# Patient Record
Sex: FEMALE | Race: WHITE | NOT HISPANIC OR LATINO | Employment: FULL TIME | URBAN - NONMETROPOLITAN AREA
[De-identification: names, ages, dates, MRNs, and addresses within clinical notes are randomized per-mention and may not be internally consistent; named-entity substitution may affect disease eponyms.]

---

## 2017-02-09 ENCOUNTER — OFFICE VISIT (OUTPATIENT)
Dept: MEDICAL GROUP | Facility: CLINIC | Age: 52
End: 2017-02-09
Payer: COMMERCIAL

## 2017-02-09 VITALS
HEART RATE: 75 BPM | OXYGEN SATURATION: 95 % | RESPIRATION RATE: 16 BRPM | DIASTOLIC BLOOD PRESSURE: 80 MMHG | HEIGHT: 61 IN | SYSTOLIC BLOOD PRESSURE: 122 MMHG | TEMPERATURE: 97.5 F | BODY MASS INDEX: 34.36 KG/M2 | WEIGHT: 182 LBS

## 2017-02-09 DIAGNOSIS — Z76.89 ESTABLISHING CARE WITH NEW DOCTOR, ENCOUNTER FOR: ICD-10-CM

## 2017-02-09 DIAGNOSIS — M25.551 BILATERAL HIP PAIN: ICD-10-CM

## 2017-02-09 DIAGNOSIS — F41.9 ANXIETY: ICD-10-CM

## 2017-02-09 DIAGNOSIS — M25.552 BILATERAL HIP PAIN: ICD-10-CM

## 2017-02-09 DIAGNOSIS — E66.9 OBESITY (BMI 30-39.9): ICD-10-CM

## 2017-02-09 DIAGNOSIS — F33.0 MILD EPISODE OF RECURRENT MAJOR DEPRESSIVE DISORDER (HCC): ICD-10-CM

## 2017-02-09 DIAGNOSIS — Z23 NEED FOR TDAP VACCINATION: ICD-10-CM

## 2017-02-09 DIAGNOSIS — Z12.11 SCREENING FOR COLON CANCER: ICD-10-CM

## 2017-02-09 DIAGNOSIS — I10 ESSENTIAL HYPERTENSION: ICD-10-CM

## 2017-02-09 DIAGNOSIS — Z12.39 SCREENING FOR BREAST CANCER: ICD-10-CM

## 2017-02-09 PROCEDURE — 90715 TDAP VACCINE 7 YRS/> IM: CPT | Performed by: NURSE PRACTITIONER

## 2017-02-09 PROCEDURE — 99204 OFFICE O/P NEW MOD 45 MIN: CPT | Mod: 25 | Performed by: NURSE PRACTITIONER

## 2017-02-09 PROCEDURE — 90471 IMMUNIZATION ADMIN: CPT | Performed by: NURSE PRACTITIONER

## 2017-02-09 RX ORDER — ATENOLOL 25 MG/1
25 TABLET ORAL DAILY
COMMUNITY

## 2017-02-09 ASSESSMENT — PATIENT HEALTH QUESTIONNAIRE - PHQ9: CLINICAL INTERPRETATION OF PHQ2 SCORE: 0

## 2017-02-09 ASSESSMENT — PAIN SCALES - GENERAL: PAINLEVEL: 3=SLIGHT PAIN

## 2017-02-09 NOTE — MR AVS SNAPSHOT
"        Sera Chavira   2017 8:00 AM   Office Visit   MRN: [id number]    Department:  [facility] Phone:  [phone]    Description:  Female : 1965   Provider:  AYE Taylor           Reason for Visit     Hip Pain x 2 years getting worse waking her up at night    Other due for Mammo in last Oct, is open for pap an fit test      Allergies as of 2017     No Known Allergies      You were diagnosed with     Establishing care with new doctor, encounter for   [982804]       Obesity (BMI 30-39.9)   [256798]       Essential hypertension   [1147870]       Mild episode of recurrent major depressive disorder (CMS-HCC)   [6838945]       Bilateral hip pain   [372686]       Anxiety   [693008]       Screening for breast cancer   [583143]       Screening for colon cancer   [867195]       Need for Tdap vaccination   [189551]         Vital Signs     Blood Pressure Pulse Temperature Respirations Height Weight    122/80 mmHg 75 36.4 °C (97.5 °F) 16 1.549 m (5' 0.98\") 82.555 kg (182 lb)    Body Mass Index Oxygen Saturation Smoking Status             34.41 kg/m2 95% Never Smoker          Basic Information     Date Of Birth Sex Race Ethnicity Preferred Language    1965 Female White Non- English      Problem List              ICD-10-CM Priority Class Noted - Resolved    Obesity (BMI 30-39.9) E66.9   2017 - Present    Establishing care with new doctor, encounter for Z71.89   2017 - Present    Essential hypertension I10   2017 - Present    Mild episode of recurrent major depressive disorder (CMS-HCC) F33.0   2017 - Present    Bilateral hip pain M25.551, M25.552   2017 - Present    Anxiety F41.9   2017 - Present      Health Maintenance        Date Due Completion Dates    IMM DTaP/Tdap/Td Vaccine (1 - Tdap) 1982 ---    PAP SMEAR 1984 ---    MAMMOGRAM 2003 ---    COLONOSCOPY 2013 ---    IMM INFLUENZA (1) 2016 ---            Current " Immunizations     Tdap Vaccine 2/9/2017      Below and/or attached are the medications your provider expects you to take. Review all of your home medications and newly ordered medications with your provider and/or pharmacist. Follow medication instructions as directed by your provider and/or pharmacist. Please keep your medication list with you and share with your provider. Update the information when medications are discontinued, doses are changed, or new medications (including over-the-counter products) are added; and carry medication information at all times in the event of emergency situations     Allergies:  No Known Allergies          Medications  Valid as of: February 09, 2017 -  8:42 AM    Generic Name Brand Name Tablet Size Instructions for use    Atenolol (Tab) TENORMIN 25 MG Take 25 mg by mouth every day.        .                 Medicines prescribed today were sent to:     [facility] [address] [place] [address] - [address] [facility]    [address] [facility] [address]    Phone: [phone] Fax: [phone]    Open 24 Hours?: No      Medication refill instructions:       If your prescription bottle indicates you have medication refills left, it is not necessary to call your provider’s office. Please contact your pharmacy and they will refill your medication.    If your prescription bottle indicates you do not have any refills left, you may request refills at any time through one of the following ways: The online [employer] system (except Urgent Care), by calling your provider’s office, or by asking your pharmacy to contact your provider’s office with a refill request. Medication refills are processed only during regular business hours and may not be available until the next business day. Your provider may request additional information or to have a follow-up visit with you prior to refilling your medication.   *Please Note: Medication refills are assigned a new Rx number when refilled  electronically. Your pharmacy may indicate that no refills were authorized even though a new prescription for the same medication is available at the pharmacy. Please request the medicine by name with the pharmacy before contacting your provider for a refill.        Your To Do List     Future Labs/Procedures Complete By Expires    DX-HIP-COMPLETE - UNILATERAL 2+ LEFT  As directed 2/9/2018    DX-HIP-COMPLETE - UNILATERAL 2+ RIGHT  As directed 2/9/2018      Referral     A referral request has been sent to our patient care coordination department. Please allow 3-5 business days for us to process this request and contact you either by phone or mail. If you do not hear from us by the 5th business day, please call us at [phone].        Instructions    Hip Pain  Your hip is the joint between your upper legs and your lower pelvis. The bones, cartilage, tendons, and muscles of your hip joint perform a lot of work each day supporting your body weight and allowing you to move around.  Hip pain can range from a minor ache to severe pain in one or both of your hips. Pain may be felt on the inside of the hip joint near the groin, or the outside near the buttocks and upper thigh. You may have swelling or stiffness as well.   HOME CARE INSTRUCTIONS   · Take medicines only as directed by your health care provider.  · Apply ice to the injured area:  ¨ Put ice in a plastic bag.  ¨ Place a towel between your skin and the bag.  ¨ Leave the ice on for 15-20 minutes at a time, 3-4 times a day.  · Keep your leg raised (elevated) when possible to lessen swelling.  · Avoid activities that cause pain.  · Follow specific exercises as directed by your health care provider.  · Sleep with a pillow between your legs on your most comfortable side.  · Record how often you have hip pain, the location of the pain, and what it feels like.  SEEK MEDICAL CARE IF:   · You are unable to put weight on your leg.  · Your hip is red or swollen or very  tender to touch.  · Your pain or swelling continues or worsens after 1 week.  · You have increasing difficulty walking.  · You have a fever.  SEEK IMMEDIATE MEDICAL CARE IF:   · You have fallen.  · You have a sudden increase in pain and swelling in your hip.  MAKE SURE YOU:   · Understand these instructions.  · Will watch your condition.  · Will get help right away if you are not doing well or get worse.     This information is not intended to replace advice given to you by your health care provider. Make sure you discuss any questions you have with your health care provider.     Document Released: 06/07/2011 Document Revised: 01/08/2016 Document Reviewed: 08/14/2014  [employer] Interactive Patient Education ©2016 [employer]    Your medical care was provided today by: GLORIA Henao    Thank You for the opportunity to serve you.    You may receive a brief survey in the mail shortly regarding your visit today. Please take a few moments to complete the survey and return it; no postage is necessary. We are working to serve our patient population better, improve customer service and our patients overall experience and your input can help us to accomplish this. We thank you for your help and for the opportunity to serve you today and in the future.     Special Instructions:  Always call 9-1-1 immediately if you develop a life threatening emergency.    Unless told otherwise please take all medications as directed and complete prescription therapies.     Watch for the following signs that require additional evaluation: progressive lethargy or unresponsiveness, localized pain (chest, abdomen), shortness of breath, painful breathing, progressive vomiting with weakness, bloody stools, or new rash.     If you are prescribed pain medication or any other medication that is sedating, do not take medication before or while operating a vehicle or heavy machinery or equipment due to potential side effects such as drowsiness  and/or dizziness.            Cartavi Access Code: [email or web address]  Expires: 3/11/2017  8:21 AM    Your email address is not on file at [facility].  Email Addresses are required for you to sign up for Cartavi, please contact [phone] to verify your personal information and to provide your email address prior to attempting to register for Cartavi.    Sera Chavira  [address] [place] [address], [place] [address]    Cartavi  A secure, online tool to manage your health information     [facility]’s Cartavi® is a secure, online tool that connects you to your personalized health information from the privacy of your home -- day or night - making it very easy for you to manage your healthcare. Once the activation process is completed, you can even access your medical information using the Cartavi lizette, which is available for free in the Apple Lizette store or Google Play store.     To learn more about Cartavi, visit [email or web address]    There are two levels of access available (as shown below):   My Chart Features  [facility] Primary Care Doctor [facility]  Specialists [facility]  Urgent  Care Non-[facility] Primary Care Doctor   Email your healthcare team securely and privately 24/7 X X X    Manage appointments: schedule your next appointment; view details of past/upcoming appointments X      Request prescription refills. X      View recent personal medical records, including lab and immunizations X X X X   View health record, including health history, allergies, medications X X X X   Read reports about your outpatient visits, procedures, consult and ER notes X X X X   See your discharge summary, which is a recap of your hospital and/or ER visit that includes your diagnosis, lab results, and care plan X X  X     How to register for Cartavi:  Once your e-mail address has been verified, follow the following steps to sign up for Cartavi.     1. Go to  [email or web address]  2. Click on the Sign Up Now box, which takes you to the New  Member Sign Up page. You will need to provide the following information:  a. Enter your [employer] Access Code exactly as it appears at the top of this page. (You will not need to use this code after you’ve completed the sign-up process. If you do not sign up before the expiration date, you must request a new code.)   b. Enter your date of birth.   c. Enter your home email address.   d. Click Submit, and follow the next screen’s instructions.  3. Create a [employer] ID. This will be your [employer] login ID and cannot be changed, so think of one that is secure and easy to remember.  4. Create a [employer] password. You can change your password at any time.  5. Enter your Password Reset Question and Answer. This can be used at a later time if you forget your password.   6. Enter your e-mail address. This allows you to receive e-mail notifications when new information is available in [employer].  7. Click Sign Up. You can now view your health information.    For assistance activating your [employer] account, call [phone]

## 2017-02-09 NOTE — PROGRESS NOTES
Chief Complaint   Patient presents with   • Hip Pain     x 2 years getting worse waking her up at night   • Other     due for Mammo in last Oct, is open for pap an fit test        Sera Chavira is a 52 y.o. female here today to establish care and to discuss the evaluation and management of:    HPI:      Establishing care with new doctor, encounter for  Patient has lived here in [place] for about 3 years. She has been seeing Dr. Lujan, a conciege provider, from [place]. She now has insurance and would like to est. Care. She is a , however, recently has a new boyfriend.     Previously she worked at the [employer], was walking several hours a day. She currently is an  in [place].         Mild episode of recurrent major depressive disorder (CMS-[facility])  Patient reports a history of taking Lexapro daily, she stopped this medication approximately 3 months ago on her own. Reports the medication was causing her to have decreased sex drive. She has been stable since stopping her medication. Denies SI/HI. She does not believe she needs any medication at this time.         Obesity (BMI 30-39.9)  Patient reports she is active at her current job, often walking several miles a day. She has tried to eat a healthy diet, she is aware that her diet could improve.    Bilateral hip pain  Reports hip pain for about 2 years. She has been treated previously with bilateral hip injections, which did work at one time, however, her most recent injection about 6 months ago, did not seem to help as much. She has a hard time sleeping on each of her side. Pain is worse at the end of the work day. She has not completed at PT, this is tough due to her work schedule. Denies any fall or traumatic injury. She does report she has rode horses for much of her life Which she thinks may contribute to her hip pain. Denies any redness or signs of infection in her joints. She has never had any imaging done.    Essential  hypertension  Patient has been taking atenolol 25 mg daily for several years. She has been stable, denies any dizziness, edema, chest pain.    Anxiety  Intermittent anxiety, sometimes she feels like her heart is racing. This does not happen often, she feels well controlled at this time.       Current medicines (including changes today)  Current Outpatient Prescriptions   Medication Sig Dispense Refill   • atenolol (TENORMIN) 25 MG Tab Take 25 mg by mouth every day.       No current facility-administered medications for this visit.       She  has a past medical history of Depression and Hypertension.    She  has past surgical history that includes shoulder arthroscopy w/ slap / labral repair and abdominal hysterectomy total.    Social History   Substance Use Topics   • Smoking status: Never Smoker    • Smokeless tobacco: Never Used   • Alcohol Use: No       Social History     Social History Narrative   • No narrative on file       Family History   Problem Relation Age of Onset   • Heart Disease Mother    • Hypertension Mother    • Diabetes Mother    • Hyperlipidemia Mother    • Alcohol/Drug Father    • Cancer Paternal Grandmother        Family Status   Relation Status Death Age   • Mother     • Father     • Paternal Grandmother         ROS   Constitutional: Denies fever, chills, or sweats  Eyes: negative for visual blurring, double vision, eye pain, floaters and discharge from eyes  ENT: negative for tinnitus, vertigo, bleeding gums, dental problem and hoarseness, frequent URI's, sinus trouble, persistent sore throat  Respiratory: negative for persistent cough, hemoptysis, dyspnea, recurrent pneumonia or bronchitis, asthma and wheezing  Cardiovascular: negative for palpitations, tachycardia, irregular heart beat, chest pain, or peripheral edema.  Gastrointestinal: negative for poor appetite, dysphagia, nausea, heartburn or reflux, abdominal pain, hemorrhoids, constipation or  "diarrhea  Genitourinary: negative for vaginal bleeding, discharge. Negative for dysuria.   Musculoskeletal: negative for joint swelling. Positive for bilateral hip pain.   Skin: negative for rash, scaling, itching, pigmentation, hair or nail changes.  Neurologic: negative for migraine headaches, involuntary movements or tremor  Psychiatric: negative for excessive alcohol consumption or illegal drug usage, sleep disturbance. Positive for depression, anxiety, well controlled.   Hematologic/Lymphatic/Immunologic: negative for anemia, unusual bruising, swollen glands  Endocrine: negative for temperature intolerance, polydipsia, polyuria, unintentional weight changes.        Objective:     Blood pressure 122/80, pulse 75, temperature 36.4 °C (97.5 °F), resp. rate 16, height 1.549 m (5' 0.98\"), weight 82.555 kg (182 lb), SpO2 95 %. Body mass index is 34.41 kg/(m^2).    Physical Exam:   Constitutional: Alert, no distress.  Eye: Equal, round and reactive, conjunctiva clear, lids normal.  ENMT: Lips without lesions, good dentition, oropharynx clear.  Neck: Trachea midline, no masses, no thyromegaly.   Respiratory: Unlabored respiratory effort, lungs clear to auscultation, no wheezes, no ronchi.  Cardiovascular: Normal S1, S2, no murmur, no edema.   Abdomen: Soft, non-tender, no masses, no hepatosplenomegaly. Normal bowel sounds.   Psych: Alert and oriented x3, normal affect and mood.    HIP Exam:      Gait: Normal, Patient is not using any assistive device today.      ROM: Abduction:  45 deg                Adduction:  25 deg                Internal Rotation:  45 deg                External Rotation:  45 deg     Straight leg raise:  Normal bilaterally with mild reported pain  Neuro: Alert, Oriented X 3, cooperative, moving all extremities. Full/symmetrical motor strength. Normal/symmetrical DTR's. Normal proximal/distal sensory in extremities.  SKIN: No rashes, redness, heat or signs of infection noted at joint. "     RADIOGRAPHS:  Ordered bilateral Hip xray, pending results         Assessment and Plan:   The following treatment plan was discussed    1. Establishing care with new doctor, encounter for  Advised patient to complete release of records. I do not have any prior records to review today.    2. Obesity (BMI 30-39.9)  Advised to continue with healthy diet. Advised to exercise 3-5 days weekly.  - Patient identified as having weight management issue.  Appropriate orders and counseling given.    3. Essential hypertension  Patient is not in need of refills today. Advised may continue with atenolol 25 mg daily. Discussed signs and symptoms to seek emergent care. Likely this medication also helps to control her anxiety.    4. Mild episode of recurrent major depressive disorder (CMS-HCC)  Patient is stable without current medication. Discussed signs and symptoms to seek emergent care. Patient denies SI/HI. She is not interested in seeing a counselor at this time, her job without allow for take time off. She appears in good spirits today, with her new boyfriend she reports she has good support. Her and her partner thinking of getting .    5. Bilateral hip pain  Advised patient to obtain x-rays. Pending x-ray results advised to start physical therapy. Patient reports she may not be able to complete physical therapy due to her strict work schedule.   - DX-HIP-COMPLETE - UNILATERAL 2+ RIGHT; Future  - DX-HIP-COMPLETE - UNILATERAL 2+ LEFT; Future  - REFERRAL TO PHYSICAL THERAPY Reason for Therapy: Eval/Treat/Report    6. Anxiety  Patient is currently stable. We'll continue to monitor.    7. Screening for breast cancer  - MA-SCREEN MAMMO W/CAD-BILAT    8. Screening for colon cancer  - REFERRAL TO GI FOR COLONOSCOPY    9. Need for Tdap vaccination  - Tdap =>8yo IM       Reviewed risks and benefits of treatment plan. Patient verbally agrees to plan of care.     Records requested.    Followup: Return in about 3 months (around  5/9/2017) for Annual Exam.    OBI Taylor.     PLEASE NOTE: This dictation was created using voice recognition software. I have made every reasonable attempt to correct obvious errors, but I expect that there may be errors of grammar and possibly content that I did not discover prior finalizing this note.

## 2017-02-09 NOTE — ASSESSMENT & PLAN NOTE
Patient reports she is active at her current job, often walking several miles a day. She has tried to eat a healthy diet, she is aware that her diet could improve.

## 2017-02-09 NOTE — PATIENT INSTRUCTIONS
Hip Pain  Your hip is the joint between your upper legs and your lower pelvis. The bones, cartilage, tendons, and muscles of your hip joint perform a lot of work each day supporting your body weight and allowing you to move around.  Hip pain can range from a minor ache to severe pain in one or both of your hips. Pain may be felt on the inside of the hip joint near the groin, or the outside near the buttocks and upper thigh. You may have swelling or stiffness as well.   HOME CARE INSTRUCTIONS   · Take medicines only as directed by your health care provider.  · Apply ice to the injured area:  ¨ Put ice in a plastic bag.  ¨ Place a towel between your skin and the bag.  ¨ Leave the ice on for 15-20 minutes at a time, 3-4 times a day.  · Keep your leg raised (elevated) when possible to lessen swelling.  · Avoid activities that cause pain.  · Follow specific exercises as directed by your health care provider.  · Sleep with a pillow between your legs on your most comfortable side.  · Record how often you have hip pain, the location of the pain, and what it feels like.  SEEK MEDICAL CARE IF:   · You are unable to put weight on your leg.  · Your hip is red or swollen or very tender to touch.  · Your pain or swelling continues or worsens after 1 week.  · You have increasing difficulty walking.  · You have a fever.  SEEK IMMEDIATE MEDICAL CARE IF:   · You have fallen.  · You have a sudden increase in pain and swelling in your hip.  MAKE SURE YOU:   · Understand these instructions.  · Will watch your condition.  · Will get help right away if you are not doing well or get worse.     This information is not intended to replace advice given to you by your health care provider. Make sure you discuss any questions you have with your health care provider.     Document Released: 06/07/2011 Document Revised: 01/08/2016 Document Reviewed: 08/14/2014  [email or web address] Interactive Patient Education ©2016 [employer]    Your medical care was  provided today by: GLORIA Henao    Thank You for the opportunity to serve you.    You may receive a brief survey in the mail shortly regarding your visit today. Please take a few moments to complete the survey and return it; no postage is necessary. We are working to serve our patient population better, improve customer service and our patients overall experience and your input can help us to accomplish this. We thank you for your help and for the opportunity to serve you today and in the future.     Special Instructions:  Always call 9-1-1 immediately if you develop a life threatening emergency.    Unless told otherwise please take all medications as directed and complete prescription therapies.     Watch for the following signs that require additional evaluation: progressive lethargy or unresponsiveness, localized pain (chest, abdomen), shortness of breath, painful breathing, progressive vomiting with weakness, bloody stools, or new rash.     If you are prescribed pain medication or any other medication that is sedating, do not take medication before or while operating a vehicle or heavy machinery or equipment due to potential side effects such as drowsiness and/or dizziness.

## 2017-02-09 NOTE — ASSESSMENT & PLAN NOTE
Intermittent anxiety, sometimes she feels like her heart is racing. This does not happen often, she feels well controlled at this time.

## 2017-02-09 NOTE — ASSESSMENT & PLAN NOTE
Patient has lived here in [place] for about 3 years. She has been seeing Dr. Lujan, a olive provider, from [place]. She now has insurance and would like to est. Care. She is a , however, recently has a new boyfriend.     Previously she worked at the [employer], was walking several hours a day. She currently is an  in [place].

## 2017-02-09 NOTE — ASSESSMENT & PLAN NOTE
Reports hip pain for about 2 years. She has been treated previously with bilateral hip injections, which did work at one time, however, her most recent injection about 6 months ago, did not seem to help as much. She has a hard time sleeping on each of her side. Pain is worse at the end of the work day. She has not completed at PT, this is tough due to her work schedule. Denies any fall or traumatic injury. She does report she has rode horses for much of her life Which she thinks may contribute to her hip pain. Denies any redness or signs of infection in her joints. She has never had any imaging done.

## 2017-02-09 NOTE — ASSESSMENT & PLAN NOTE
Patient reports a history of taking Lexapro daily, she stopped this medication approximately 3 months ago on her own. Reports the medication was causing her to have decreased sex drive. She has been stable since stopping her medication. Denies SI/HI. She does not believe she needs any medication at this time.

## 2017-02-09 NOTE — Clinical Note
2/9/2017    Subject: Action Required to Complete PricePanda Activation    Dear Sera Chavira,    Thank you for enrolling in PricePanda, a free online tool where you can schedule appointments, request prescription refills, view test results and more. To complete your PricePanda activation, please follow the instructions below.     1. Visit [employer]     2. Click “Sign Up Now”    3. Enter activation code: [email or web address]  Expires: 3/11/2017  8:21 AM    4. Follow the instructions on screen to complete the quick, 3-step activation    Once you’ve completed your activation, you’re ready to view your medical record. Please remember, PricePanda is not to be used for urgent needs. For medical emergencies, dial 911.    Benefits of [employer]’s secure online tool allows you to manage your health information day or night. PricePanda allows you to:    • Schedule and view appointments  • View test results  • Request prescription refills  • Message your healthcare provider  • Keep track of your family’s health  • Review immunization records  • View or download your Summary of Care document    Download the PricePanda Lizette   After you’ve created a login by following the steps above, you can download the PricePanda lizette for your smartphone for even quicker access. Simply visit the lizette store and search “PricePanda.”    For questions or assistance with PricePanda, please call Customer Service at [phone].    Sincerely,  Customer Service Team

## 2017-02-13 ENCOUNTER — APPOINTMENT (OUTPATIENT)
Dept: RADIOLOGY | Facility: IMAGING CENTER | Age: 52
End: 2017-02-13
Attending: NURSE PRACTITIONER
Payer: COMMERCIAL

## 2017-02-13 ENCOUNTER — NON-PROVIDER VISIT (OUTPATIENT)
Dept: URGENT CARE | Facility: PHYSICIAN GROUP | Age: 52
End: 2017-02-13
Payer: COMMERCIAL

## 2017-02-13 DIAGNOSIS — M25.551 BILATERAL HIP PAIN: ICD-10-CM

## 2017-02-13 DIAGNOSIS — M25.552 BILATERAL HIP PAIN: ICD-10-CM

## 2017-02-13 PROCEDURE — 73521 X-RAY EXAM HIPS BI 2 VIEWS: CPT | Mod: 26 | Performed by: PHYSICIAN ASSISTANT

## 2017-02-14 ENCOUNTER — TELEPHONE (OUTPATIENT)
Dept: URGENT CARE | Facility: PHYSICIAN GROUP | Age: 52
End: 2017-02-14

## 2017-02-14 NOTE — TELEPHONE ENCOUNTER
Pt notified - she will call and schedule an appt if the pain is still present after.  I gave her the information for PT.        AYE Taylor [facility]                   Please advise patient of message below.     I have reviewed her recent hip xrays. Please advise there is some minor bone spurring on the left (which can happen as we get older and due to over use), but otherwise normal. I would suggest completing 6-8 weeks of PT. If she is still having pain after that time, we can consider a referral to a Ortho Specialist. She can make an appt of give us a call. Thank you.     Thank you.       AYE Taylor

## 2017-10-04 ENCOUNTER — OFFICE VISIT (OUTPATIENT)
Dept: MEDICAL GROUP | Facility: CLINIC | Age: 52
End: 2017-10-04
Payer: COMMERCIAL

## 2017-10-04 VITALS
RESPIRATION RATE: 16 BRPM | WEIGHT: 184 LBS | DIASTOLIC BLOOD PRESSURE: 84 MMHG | TEMPERATURE: 97.7 F | SYSTOLIC BLOOD PRESSURE: 128 MMHG | HEART RATE: 72 BPM | OXYGEN SATURATION: 96 % | HEIGHT: 61 IN | BODY MASS INDEX: 34.74 KG/M2

## 2017-10-04 DIAGNOSIS — M70.61 TROCHANTERIC BURSITIS OF BOTH HIPS: ICD-10-CM

## 2017-10-04 DIAGNOSIS — M70.62 TROCHANTERIC BURSITIS OF BOTH HIPS: ICD-10-CM

## 2017-10-04 DIAGNOSIS — E66.9 OBESITY (BMI 30-39.9): ICD-10-CM

## 2017-10-04 DIAGNOSIS — M25.551 BILATERAL HIP PAIN: ICD-10-CM

## 2017-10-04 DIAGNOSIS — M25.552 BILATERAL HIP PAIN: ICD-10-CM

## 2017-10-04 PROCEDURE — 99212 OFFICE O/P EST SF 10 MIN: CPT | Performed by: PHYSICIAN ASSISTANT

## 2017-10-04 RX ORDER — DICLOFENAC SODIUM 75 MG/1
75 TABLET, DELAYED RELEASE ORAL 2 TIMES DAILY
Qty: 60 TAB | Refills: 0 | Status: SHIPPED | OUTPATIENT
Start: 2017-10-04

## 2017-10-04 RX ORDER — TRAZODONE HYDROCHLORIDE 50 MG/1
50 TABLET ORAL NIGHTLY
COMMUNITY

## 2017-10-04 NOTE — PROGRESS NOTES
Chief Complaint   Patient presents with   • Referral Needed     ortho       HISTORY OF PRESENT ILLNESS: Patient is a 52 y.o. female established patient who presents today for evaluation and management of:    Trochanteric bursitis of both hips  Patient has had multiple injections in her bilateral hips in the past. Recently she has been to physical therapy without relief and would like to be seen by an orthopedist for this problem. She has pain in bilateral hips that is worst with sitting for long periods of time and often, if she lays on her side to sleep she will wake up in tears due to the pain. She takes ibuprofen on a daily basis with very mild relief. She does not have heat or redness on either hip and ice packs sometimes help her pain as well. She has a very active job and cannot continue to suffer with this.        Patient Active Problem List    Diagnosis Date Noted   • Obesity (BMI 30-39.9) 2017   • Establishing care with new doctor, encounter for 2017   • Essential hypertension 2017   • Mild episode of recurrent major depressive disorder (CMS-[facility]) 2017   • Trochanteric bursitis of both hips 2017   • Anxiety 2017       Allergies:Review of patient's allergies indicates no known allergies.    Current Outpatient Prescriptions   Medication Sig Dispense Refill   • diclofenac EC (VOLTAREN) 75 MG Tablet Delayed Response Take 1 Tab by mouth 2 times a day. 60 Tab 0   • atenolol (TENORMIN) 25 MG Tab Take 25 mg by mouth every day.     • trazodone (DESYREL) 50 MG Tab Take 50 mg by mouth every evening.       No current facility-administered medications for this visit.        Social History   Substance Use Topics   • Smoking status: Never Smoker   • Smokeless tobacco: Never Used   • Alcohol use No       Family Status   Relation Status   • Mother    • Father    • Paternal Grandmother      Family History   Problem Relation Age of Onset   • Heart Disease Mother    •  "Hypertension Mother    • Diabetes Mother    • Hyperlipidemia Mother    • Alcohol/Drug Father    • Cancer Paternal Grandmother        Review of Systems:   Constitutional: Negative for fever, chills, weight loss and malaise/fatigue.   Musculoskeletal: See HPI above. Negative for myalgias, back pain and joint pain.    Neurological: Negative for dizziness, tingling, tremors, sensory change, focal weakness and headaches.   Psychiatric/Behavioral: Negative for depression, suicidal ideas and memory loss.  The patient is not nervous/anxious and does not have insomnia.      Exam:  Blood pressure 128/84, pulse 72, temperature 36.5 °C (97.7 °F), resp. rate 16, height 1.549 m (5' 1\"), weight 83.5 kg (184 lb), SpO2 96 %.  Body mass index is 34.77 kg/m².  General:  Obese female in NAD  Head: is grossly normal.  Neck: Supple without masses. Thyroid is not visibly enlarged.  Pulmonary: Normal effort.   Cardiovascular: Carotid and radial pulses are intact and equal bilaterally.  Extremities: no clubbing, cyanosis, or edema.  Musculoskeletal: gait with upper body slightly bent forward upon standing after sitting for approximately 15 minutes, straightens out with walking.     Medical decision-making and discussion:    1. Trochanteric bursitis of both hips  - diclofenac EC (VOLTAREN) 75 MG Tablet Delayed Response; Take 1 Tab by mouth 2 times a day.  Dispense: 60 Tab; Refill: 0  - REFERRAL TO ORTHOPEDICS      Please note that this dictation was created using voice recognition software. I have made every reasonable attempt to correct obvious errors, but I expect that there are errors of grammar and possibly content that I did not discover before finalizing the note.      Return for acute or follow up care prn.  "

## 2018-07-10 ENCOUNTER — OCCUPATIONAL MEDICINE (OUTPATIENT)
Dept: URGENT CARE | Facility: PHYSICIAN GROUP | Age: 53
End: 2018-07-10
Payer: COMMERCIAL

## 2018-07-10 VITALS
HEIGHT: 61 IN | TEMPERATURE: 98.2 F | HEART RATE: 90 BPM | WEIGHT: 182 LBS | RESPIRATION RATE: 16 BRPM | BODY MASS INDEX: 34.36 KG/M2 | DIASTOLIC BLOOD PRESSURE: 84 MMHG | OXYGEN SATURATION: 96 % | SYSTOLIC BLOOD PRESSURE: 120 MMHG

## 2018-07-10 DIAGNOSIS — S61.452A DOG BITE OF LEFT HAND, INITIAL ENCOUNTER: ICD-10-CM
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
## 2018-07-10 PROCEDURE — 99214 OFFICE O/P EST MOD 30 MIN: CPT | Mod: 29 | Performed by: PHYSICIAN ASSISTANT

## 2018-07-10 NOTE — LETTER
"EMPLOYEE’S CLAIM FOR COMPENSATION/ REPORT OF INITIAL TREATMENT  FORM C-4    EMPLOYEE’S CLAIM - PROVIDE ALL INFORMATION REQUESTED   First Name  Sera Last Name  Kenya Birthdate                    1965                Sex  female Claim Number   Home Address  [address] Age  53 y.o. 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 (if applicable)  walking into back kennels    How did this injury or occupational disease occur? (Be specific an answer in detail. Use additional sheet if necessary)  walking past another ACO with a dog on a leash as we passed the dog turned and grabbed my hand   If you believe that you have an occupational disease, when did you first have knowledge of the disability and it relationship to your employment?  N/A Witnesses to the Accident  Megan Crespo      Nature of Injury or Occupational Disease  Puncture  Part(s) of Body Injured or Affected  Hand (L), ,     I certify that the above is true and correct to the best of my knowledge and that I have provided this " information in order to obtain the benefits of [place]  I hereby authorize any physician, chiropractor, surgeon, practitioner, or other person, any hospital, including [facility], any medical service organization, any insurance company, or other institution or organization to release to each other, any medical or other information, including benefits paid or payable, pertinent to this injury or disease, except information relative to diagnosis, treatment and/or counseling for AIDS, psychological conditions, alcohol or controlled substances, for which I must give specific authorization.  A Photostat of this authorization shall be as valid as the original.     Date 07/10/18   Place [facility]   Employee’s Signature   THIS REPORT MUST BE COMPLETED AND MAILED WITHIN 3 WORKING DAYS OF TREATMENT   Place  [facility]  Name of Facility  [place]   Date  7/10/2018 Diagnosis  (S61.452A,  W54.0XXA) Dog bite of left hand, initial encounter Is there evidence the injured employee was under the influence of alcohol and/or another controlled substance at the time of accident?   Hour  1:38 PM Description of Injury or Disease  The encounter diagnosis was Dog bite of left hand, initial encounter. No   Treatment  Assessment/Plan:  Discussed wound care home. Recommend soaking in warm Epsom salt water. No restrictions at work. Return to clinic 7/16/18 for reevaluation/possible discharge, sooner for any signs of infection.  1. Dog bite of left hand, initial encounter    Have you advised the patient to remain off work five days or more? No   X-Ray Findings      If Yes   From Date  To Date      From information given by the employee, together with medical evidence, can you directly connect this injury or occupational disease as job incurred?  Yes If No Full Duty  Yes Modified Duty     "  Is additional medical care by a physician indicated?  Yes If Modified Duty, Specify any Limitations / Restrictions      Do you know of any previous injury or disease contributing to this condition or occupational disease?                            No   Date  7/10/2018 Print Doctor’s Name Isabell Vital P.A.-C. I certify the employer’s copy of  this form was mailed on:   Address  [address] Insurer’s Use Only     [place]  [address]    Provider’s Tax ID Number  [id number] Telephone  Dept: [phone]        e-ISABELL Vanegas P.A.-C.   e-Signature: Dr. Dillan Starr, Medical Director Degree  PAC        ORIGINAL-TREATING PHYSICIAN OR CHIROPRACTOR    PAGE 2-INSURER/TPA    PAGE 3-EMPLOYER    PAGE 4-EMPLOYEE             Form C-4 (rev10/07)              BRIEF DESCRIPTION OF RIGHTS AND BENEFITS  (Pursuant to [place]RS 616C.050)    Notice of Injury or Occupational Disease (Incident Report Form C-1): If an injury or occupational disease (OD) arises out of and in the  course of employment, you must provide written notice to your employer as soon as practicable, but no later than 7 days after the accident or  OD. Your employer shall maintain a sufficient supply of the required forms.    Claim for Compensation (Form C-4): If medical treatment is sought, the form C-4 is available at the place of initial treatment. A completed  \"Claim for Compensation\" (Form C-4) must be filed within 90 days after an accident or OD. The treating physician or chiropractor must,  within 3 working days after treatment, complete and mail to the employer, the employer's insurer and third-party , the Claim for  Compensation.    Medical Treatment: If you require medical treatment for your on-the-job injury or OD, you may be required to select a physician or  chiropractor from a list provided by your workers’ compensation insurer, if it has contracted with an Organization for Managed Care (MCO) " or  Preferred Provider Organization (PPO) or providers of health care. If your employer has not entered into a contract with an MCO or PPO, you  may select a physician or chiropractor from the Panel of Physicians and Chiropractors. Any medical costs related to your industrial injury or  OD will be paid by your insurer.    Temporary Total Disability (TTD): If your doctor has certified that you are unable to work for a period of at least 5 consecutive days, or 5  cumulative days in a 20-day period, or places restrictions on you that your employer does not accommodate, you may be entitled to TTD  compensation.    Temporary Partial Disability (TPD): If the wage you receive upon reemployment is less than the compensation for TTD to which you are  entitled, the insurer may be required to pay you TPD compensation to make up the difference. TPD can only be paid for a maximum of 24  months.    Permanent Partial Disability (PPD): When your medical condition is stable and there is an indication of a PPD as a result of your injury or  OD, within 30 days, your insurer must arrange for an evaluation by a rating physician or chiropractor to determine the degree of your PPD. The  amount of your PPD award depends on the date of injury, the results of the PPD evaluation and your age and wage.    Permanent Total Disability (PTD): If you are medically certified by a treating physician or chiropractor as permanently and totally disabled  and have been granted a PTD status by your insurer, you are entitled to receive monthly benefits not to exceed 66 2/3% of your average  monthly wage. The amount of your PTD payments is subject to reduction if you previously received a PPD award.    Vocational Rehabilitation Services: You may be eligible for vocational rehabilitation services if you are unable to return to the job due to a  permanent physical impairment or permanent restrictions as a result of your injury or occupational  disease.    Transportation and Per Mirza Reimbursement: You may be eligible for travel expenses and per mirza associated with medical treatment.    Reopening: You may be able to reopen your claim if your condition worsens after claim closure.    Appeal Process: If you disagree with a written determination issued by the insurer or the insurer does not respond to your request, you may  appeal to the Department of Administration, , by following the instructions contained in your determination letter. You must  appeal the determination within 70 days from the date of the determination letter at [address], [address], [place], [place]  [address], or [address], [address], [place], [place] [address]. If you disagree with the  decision, you may appeal to the  Department of Administration, . You must file your appeal within 30 days from the date of the  decision  letter at [address], [address], [place], [place] [address], or [address], [place], [place] [address]. If you  disagree with a decision of an , you may file a petition for judicial review with the District Court. You must do so within 30  days of the Appeal Officer’s decision. You may be represented by an  at your own expense or you may contact the [facility] for possible  representation.    [place]  for Injured Workers (NAIW): If you disagree with a  decision, you may request that NAIW represent you  without charge at an  Hearing. For information regarding denial of benefits, you may contact the [facility] at: [address], [address], [place] [address], [phone]) [phone], or [address], [place] [address], [phone]    To File a Complaint with the Division: If you wish to file a complaint with the  of the Division of Industrial Relations (DIR),  please contact  the Workers’ Compensation Section, [address], [address], [place], [place] [address], telephone (522) 043-1620, or  [address], [address], [place], [place] [address], telephone [phone]) [phone].    For assistance with Workers’ Compensation Issues: you may contact the Office of the [facility] Consumer Health Assistance, [address] [place] [address], [address], [place], [place] [address], Toll Free 1-777.356.4918, Web site: http://EquipRent.[email or web address].[email or web address]., E-mail  [email or web address].                                                                                                                                                                                                                                   __________________________________________________________________                                                                   _____________07/10/18____                Employee Name / Signature                                                                                                                                                       Date                                                                                                                                                                                                     D-2 (rev. 10/07)

## 2018-07-10 NOTE — PROGRESS NOTES
"Chief Complaint   Patient presents with   • Hand Injury     WC New, Dog bite, L/ hand       HISTORY OF PRESENT ILLNESS: Patient is a 53 y.o. female who presents today for the following:    DOI: 7/10/18 around 11:00  Dog bite over first metacarpal left hand; minimal pain  Last tdap 2017  Dog is UTD on vaccinations    Patient Active Problem List    Diagnosis Date Noted   • Obesity (BMI 30-39.9) 2017   • Establishing care with new doctor, encounter for 2017   • Essential hypertension 2017   • Mild episode of recurrent major depressive disorder (HCC) 2017   • Trochanteric bursitis of both hips 2017   • Anxiety 2017       Allergies:Patient has no known allergies.    Current Outpatient Prescriptions Ordered in [facility]   Medication Sig Dispense Refill   • trazodone (DESYREL) 50 MG Tab Take 50 mg by mouth every evening.     • diclofenac EC (VOLTAREN) 75 MG Tablet Delayed Response Take 1 Tab by mouth 2 times a day. 60 Tab 0   • atenolol (TENORMIN) 25 MG Tab Take 25 mg by mouth every day.       No current [facility]-ordered facility-administered medications on file.        Past Medical History:   Diagnosis Date   • Depression    • Hypertension        Social History   Substance Use Topics   • Smoking status: Never Smoker   • Smokeless tobacco: Never Used   • Alcohol use No       Family Status   Relation Status   • Mother    • Father    • Paternal Grandmother      Family History   Problem Relation Age of Onset   • Heart Disease Mother    • Hypertension Mother    • Diabetes Mother    • Hyperlipidemia Mother    • Alcohol/Drug Father    • Cancer Paternal Grandmother        Review of Systems:   Constitutional ROS: No unexpected change in weight, No weakness, No fatigue  Hematologic/Lymphatic ROS: No chills, No night sweats, No weight loss      Exam:  Blood pressure 120/84, pulse 90, temperature 36.8 °C (98.2 °F), resp. rate 16, height 1.549 m (5' 1\"), weight 82.6 kg (182 " lb), SpO2 96 %.  General: Well developed, well nourished. No distress.  Pulmonary: Unlabored respiratory effort.   Cardiovascular: Brisk capillary refill in the left hand.  Extremities: Obvious bite over the first metacarpal of the left hand with a few puncture wounds. Trace edema around the bite. Full range of motion left hand/thumb.  Psych: Normal mood. Alert and oriented x3. Judgment and insight is normal.    Assessment/Plan:  Discussed wound care home. Recommend soaking in warm Epsom salt water. No restrictions at work. Return to clinic 7/16/18 for reevaluation/possible discharge, sooner for any signs of infection.  1. Dog bite of left hand, initial encounter

## 2018-07-10 NOTE — LETTER
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    Medical Information   Related to Industrial Injury? Yes    Subjective Complaints:  DOI: 7/10/18 around 11:00  Dog bite over first metacarpal left hand; minimal pain  Last tdap February 2017  Dog is UTD on vaccinations   Objective Findings: Blood pressure 120/84, pulse 90, temperature 36.8 °C (98.2 °F), resp. rate 16, height 1.549 m (5' 1\"), weight 82.6 kg (182 lb), SpO2 96 %.  General: Well developed, well nourished. No distress.  Pulmonary: Unlabored respiratory effort.   Cardiovascular: Brisk capillary refill in the left hand.  Extremities: Obvious bite over the first metacarpal of the left hand with a few puncture wounds. Trace edema around the bite. Full range of motion left hand/thumb.  Psych: Normal mood. Alert and oriented x3. Judgment and insight is normal.   Pre-Existing Condition(s):     Assessment:   Initial Visit    Status: Additional Care Required  Permanent Disability:No    Plan:      Diagnostics:      Comments:  Assessment/Plan:  Discussed wound care home. Recommend soaking in warm Epsom salt water. No restrictions at work. Return to clinic 7/16/18 for reevaluation/possible discharge, sooner for any signs of infection.  1. Dog bite of left hand, initial enco  unter      Disability Information   Status: Released to Full Duty    From:  7/10/2018  Through: 7/16/2018 Restrictions are:     Physical Restrictions "   Sitting:    Standing:    Stooping:    Bending:      Squatting:    Walking:    Climbing:    Pushing:      Pulling:    Other:    Reaching Above Shoulder (L):   Reaching Above Shoulder (R):       Reaching Below Shoulder (L):    Reaching Below Shoulder (R):      Not to exceed Weight Limits   Carrying(hrs):   Weight Limit(lb):   Lifting(hrs):   Weight  Limit(lb):     Comments:      Repetitive Actions   Hands: i.e. Fine Manipulations from Grasping:     Feet: i.e. Operating Foot Controls:     Driving / Operate Machinery:     Physician Name: Isabell Vital P.A.-C. Physician Signature: ISABELL Calixto P.A.-C. e-Signature: Dr. Dillan Starr, Medical Director   Clinic Name / Location: [address] [address] Clinic Phone Number: Dept: [phone]   Appointment Time: 12:25 Pm Visit Start Time: 1:38 PM   Check-In Time:  12:36 Pm Visit Discharge Time:  2:27PM   Original-Treating Physician or Chiropractor    Page 2-Insurer/TPA    Page 3-Employer    Page 4-Employee

## 2018-10-30 ENCOUNTER — NON-PROVIDER VISIT (OUTPATIENT)
Dept: URGENT CARE | Facility: PHYSICIAN GROUP | Age: 53
End: 2018-10-30

## 2018-10-30 DIAGNOSIS — Z23 NEED FOR RABIES VACCINATION: ICD-10-CM

## 2018-10-30 PROCEDURE — 90675 RABIES VACCINE IM: CPT | Performed by: PHYSICIAN ASSISTANT

## 2018-11-02 ENCOUNTER — NON-PROVIDER VISIT (OUTPATIENT)
Dept: URGENT CARE | Facility: PHYSICIAN GROUP | Age: 53
End: 2018-11-02
Payer: COMMERCIAL

## 2018-11-02 DIAGNOSIS — Z23 NEED FOR IMMUNIZATION AGAINST RABIES: ICD-10-CM

## 2018-11-02 PROCEDURE — 90675 RABIES VACCINE IM: CPT | Performed by: PHYSICIAN ASSISTANT

## 2018-11-06 ENCOUNTER — NON-PROVIDER VISIT (OUTPATIENT)
Dept: URGENT CARE | Facility: PHYSICIAN GROUP | Age: 53
End: 2018-11-06
Payer: COMMERCIAL

## 2018-11-06 DIAGNOSIS — Z23 NEED FOR RABIES VACCINATION: ICD-10-CM

## 2018-11-06 PROCEDURE — 90675 RABIES VACCINE IM: CPT | Performed by: NURSE PRACTITIONER

## 2018-11-14 ENCOUNTER — NON-PROVIDER VISIT (OUTPATIENT)
Dept: URGENT CARE | Facility: PHYSICIAN GROUP | Age: 53
End: 2018-11-14

## 2018-11-14 DIAGNOSIS — Z23 NEED FOR RABIES VACCINATION: ICD-10-CM

## 2018-11-14 PROCEDURE — 90675 RABIES VACCINE IM: CPT | Performed by: PHYSICIAN ASSISTANT

## 2022-04-12 NOTE — ASSESSMENT & PLAN NOTE
Patient has had multiple injections in her bilateral hips in the past. Recently she has been to physical therapy without relief and would like to be seen by an orthopedist for this problem. She has pain in bilateral hips that is worst with sitting for long periods of time and often, if she lays on her side to sleep she will wake up in tears due to the pain. She takes ibuprofen on a daily basis with very mild relief. She does not have heat or redness on either hip and ice packs sometimes help her pain as well. She has a very active job and cannot continue to suffer with this.   
12-Apr-2022 12:17